# Patient Record
Sex: MALE | Race: WHITE | NOT HISPANIC OR LATINO | Employment: FULL TIME | URBAN - METROPOLITAN AREA
[De-identification: names, ages, dates, MRNs, and addresses within clinical notes are randomized per-mention and may not be internally consistent; named-entity substitution may affect disease eponyms.]

---

## 2022-05-03 ENCOUNTER — PRE-ADMISSION TESTING (OUTPATIENT)
Dept: PREADMISSION TESTING | Facility: HOSPITAL | Age: 57
End: 2022-05-03

## 2022-05-03 ENCOUNTER — HOSPITAL ENCOUNTER (OUTPATIENT)
Dept: GENERAL RADIOLOGY | Facility: HOSPITAL | Age: 57
Discharge: HOME | End: 2022-05-03

## 2022-05-03 VITALS — BODY MASS INDEX: 41.69 KG/M2 | HEIGHT: 70 IN | WEIGHT: 291.23 LBS

## 2022-05-03 LAB
ABO GROUP BLD: NORMAL
ALBUMIN SERPL-MCNC: 4.6 G/DL (ref 3.5–5.2)
ALBUMIN/GLOB SERPL: 1.6 G/DL
ALP SERPL-CCNC: 60 U/L (ref 39–117)
ALT SERPL W P-5'-P-CCNC: 30 U/L (ref 1–41)
ANION GAP SERPL CALCULATED.3IONS-SCNC: 8 MMOL/L (ref 5–15)
AST SERPL-CCNC: 25 U/L (ref 1–40)
BASOPHILS # BLD AUTO: 0.04 10*3/MM3 (ref 0–0.2)
BASOPHILS NFR BLD AUTO: 0.6 % (ref 0–1.5)
BILIRUB SERPL-MCNC: 0.6 MG/DL (ref 0–1.2)
BLD GP AB SCN SERPL QL: NEGATIVE
BUN SERPL-MCNC: 13 MG/DL (ref 6–20)
BUN/CREAT SERPL: 16.5 (ref 7–25)
CALCIUM SPEC-SCNC: 9.4 MG/DL (ref 8.6–10.5)
CHLORIDE SERPL-SCNC: 106 MMOL/L (ref 98–107)
CO2 SERPL-SCNC: 28 MMOL/L (ref 22–29)
CREAT SERPL-MCNC: 0.79 MG/DL (ref 0.76–1.27)
CRP SERPL-MCNC: <0.3 MG/DL (ref 0–0.5)
DEPRECATED RDW RBC AUTO: 41.6 FL (ref 37–54)
EGFRCR SERPLBLD CKD-EPI 2021: 103.6 ML/MIN/1.73
EOSINOPHIL # BLD AUTO: 0.17 10*3/MM3 (ref 0–0.4)
EOSINOPHIL NFR BLD AUTO: 2.5 % (ref 0.3–6.2)
ERYTHROCYTE [DISTWIDTH] IN BLOOD BY AUTOMATED COUNT: 12.8 % (ref 12.3–15.4)
ERYTHROCYTE [SEDIMENTATION RATE] IN BLOOD: 9 MM/HR (ref 0–20)
GLOBULIN UR ELPH-MCNC: 2.8 GM/DL
GLUCOSE SERPL-MCNC: 124 MG/DL (ref 65–99)
HBA1C MFR BLD: 5.8 % (ref 4.8–5.6)
HCT VFR BLD AUTO: 42.2 % (ref 37.5–51)
HGB BLD-MCNC: 14.2 G/DL (ref 13–17.7)
IMM GRANULOCYTES # BLD AUTO: 0.01 10*3/MM3 (ref 0–0.05)
IMM GRANULOCYTES NFR BLD AUTO: 0.1 % (ref 0–0.5)
LYMPHOCYTES # BLD AUTO: 2.54 10*3/MM3 (ref 0.7–3.1)
LYMPHOCYTES NFR BLD AUTO: 37.3 % (ref 19.6–45.3)
MCH RBC QN AUTO: 29.7 PG (ref 26.6–33)
MCHC RBC AUTO-ENTMCNC: 33.6 G/DL (ref 31.5–35.7)
MCV RBC AUTO: 88.3 FL (ref 79–97)
MONOCYTES # BLD AUTO: 0.47 10*3/MM3 (ref 0.1–0.9)
MONOCYTES NFR BLD AUTO: 6.9 % (ref 5–12)
NEUTROPHILS NFR BLD AUTO: 3.58 10*3/MM3 (ref 1.7–7)
NEUTROPHILS NFR BLD AUTO: 52.6 % (ref 42.7–76)
NRBC BLD AUTO-RTO: 0 /100 WBC (ref 0–0.2)
PLATELET # BLD AUTO: 262 10*3/MM3 (ref 140–450)
PMV BLD AUTO: 11.1 FL (ref 6–12)
POTASSIUM SERPL-SCNC: 4.4 MMOL/L (ref 3.5–5.2)
PROT SERPL-MCNC: 7.4 G/DL (ref 6–8.5)
QT INTERVAL: 388 MS
QTC INTERVAL: 419 MS
RBC # BLD AUTO: 4.78 10*6/MM3 (ref 4.14–5.8)
RH BLD: POSITIVE
SODIUM SERPL-SCNC: 142 MMOL/L (ref 136–145)
WBC NRBC COR # BLD: 6.81 10*3/MM3 (ref 3.4–10.8)

## 2022-05-03 PROCEDURE — 86850 RBC ANTIBODY SCREEN: CPT

## 2022-05-03 PROCEDURE — 93010 ELECTROCARDIOGRAM REPORT: CPT | Performed by: INTERNAL MEDICINE

## 2022-05-03 PROCEDURE — 80053 COMPREHEN METABOLIC PANEL: CPT

## 2022-05-03 PROCEDURE — 83036 HEMOGLOBIN GLYCOSYLATED A1C: CPT

## 2022-05-03 PROCEDURE — 86140 C-REACTIVE PROTEIN: CPT

## 2022-05-03 PROCEDURE — 71046 X-RAY EXAM CHEST 2 VIEWS: CPT

## 2022-05-03 PROCEDURE — 36415 COLL VENOUS BLD VENIPUNCTURE: CPT

## 2022-05-03 PROCEDURE — 86900 BLOOD TYPING SEROLOGIC ABO: CPT

## 2022-05-03 PROCEDURE — 85652 RBC SED RATE AUTOMATED: CPT

## 2022-05-03 PROCEDURE — 86901 BLOOD TYPING SEROLOGIC RH(D): CPT

## 2022-05-03 PROCEDURE — 85025 COMPLETE CBC W/AUTO DIFF WBC: CPT

## 2022-05-03 PROCEDURE — 93005 ELECTROCARDIOGRAM TRACING: CPT

## 2022-05-03 ASSESSMENT — KOOS JR
KOOS JR SCORE: 24
KOOS JR SCORE: 24.875

## 2022-05-03 NOTE — DISCHARGE INSTRUCTIONS
Patient instructed to drink 20 ounces (or until full) of Gatorade and it needs to be completed 1 hour (for Main OR patients) or 2 hours (scheduled  section patients) before given arrival time for procedure (NO RED Gatorade)    Patient verbalized understanding.     Patient to apply Chlorhexadine wipes  to surgical area (as instructed) the night before procedure and the AM of procedure. Wipes provided.     Discussed with patient options for receiving total joint replacement education and assessed patient's ability and preference. Joint Replacement Guide given to patient during PAT visit since not received a copy within the last year. Encouraged patient/family to read guide thoroughly and notify PAT staff with any questions or concerns. Handout provided directing patient to links to watch online videos related to joint replacement surgery on the [facility] website. The handout gives detailed instructions for joining an online joint replacement class through Zoom or phone conference offered on . Patient agreed to participate by joining online class through Zoom. Patient verbalized understanding of instructions and to complete the online learning tool survey. Encouraged to share information with family and/or . An overview of the joint replacement education was provided during the visit including general perioperative instructions that are routine for all surgical patients (PAT PASS, wipes, directions to pre-op, etc.).

## 2022-05-03 NOTE — PAT
Patient directed to Radiology Department for CXR after Pre Admission Testing Appointment.     Patient denies any current skin issues.     Patient to apply Chlorhexadine wipes  to surgical area (as instructed) the night before procedure and the AM of procedure. Wipes provided.    Patient instructed to drink 20 ounces (or until full) of Gatorade and it needs to be completed 1 hour (for Main OR patients) or 2 hours (scheduled  section patients) before given arrival time for procedure (NO RED Gatorade)    Patient verbalized understanding.    Discussed with patient options for receiving total joint replacement education and assessed patient's ability and preference. Joint Replacement Guide given to patient during PAT visit since not received a copy within the last year. Encouraged patient/family to read guide thoroughly and notify PAT staff with any questions or concerns. Handout provided directing patient to links to watch online videos related to joint replacement surgery on the [facility] website. The handout gives detailed instructions for joining an online joint replacement class through Zoom or phone conference offered on . Patient agreed to participate by watching videos online. Patient verbalized understanding of instructions and to complete the online learning tool survey. Encouraged to share information with family and/or . An overview of the joint replacement education was provided during the visit including general perioperative instructions that are routine for all surgical patients (PAT PASS, wipes, directions to pre-op, etc.).

## 2022-05-06 ENCOUNTER — CLINICAL SUPPORT NO REQUIREMENTS (OUTPATIENT)
Dept: PREADMISSION TESTING | Facility: HOSPITAL | Age: 57
End: 2022-05-06

## 2022-05-06 LAB — SARS-COV-2 RNA PNL SPEC NAA+PROBE: NOT DETECTED

## 2022-05-06 PROCEDURE — C9803 HOPD COVID-19 SPEC COLLECT: HCPCS

## 2022-05-06 PROCEDURE — U0004 COV-19 TEST NON-CDC HGH THRU: HCPCS

## 2022-05-08 ENCOUNTER — ANESTHESIA EVENT (OUTPATIENT)
Dept: PERIOP | Facility: HOSPITAL | Age: 57
End: 2022-05-08

## 2022-05-08 RX ORDER — SODIUM CHLORIDE 0.9 % (FLUSH) 0.9 %
10 SYRINGE (ML) INJECTION EVERY 12 HOURS SCHEDULED
Status: CANCELLED | OUTPATIENT
Start: 2022-05-08

## 2022-05-08 RX ORDER — FAMOTIDINE 10 MG/ML
20 INJECTION, SOLUTION INTRAVENOUS ONCE
Status: CANCELLED | OUTPATIENT
Start: 2022-05-08 | End: 2022-05-08

## 2022-05-08 RX ORDER — SODIUM CHLORIDE 0.9 % (FLUSH) 0.9 %
10 SYRINGE (ML) INJECTION AS NEEDED
Status: CANCELLED | OUTPATIENT
Start: 2022-05-08

## 2022-05-09 ENCOUNTER — ANESTHESIA EVENT CONVERTED (OUTPATIENT)
Dept: ANESTHESIOLOGY | Facility: HOSPITAL | Age: 57
End: 2022-05-09

## 2022-05-09 ENCOUNTER — HOSPITAL ENCOUNTER (OUTPATIENT)
Facility: HOSPITAL | Age: 57
Discharge: HOME | End: 2022-05-09
Attending: ORTHOPAEDIC SURGERY | Admitting: ORTHOPAEDIC SURGERY

## 2022-05-09 ENCOUNTER — ANESTHESIA (OUTPATIENT)
Dept: PERIOP | Facility: HOSPITAL | Age: 57
End: 2022-05-09

## 2022-05-09 ENCOUNTER — APPOINTMENT (OUTPATIENT)
Dept: GENERAL RADIOLOGY | Facility: HOSPITAL | Age: 57
End: 2022-05-09

## 2022-05-09 VITALS
BODY MASS INDEX: 41.66 KG/M2 | HEIGHT: 70 IN | HEART RATE: 83 BPM | WEIGHT: 291 LBS | RESPIRATION RATE: 18 BRPM | DIASTOLIC BLOOD PRESSURE: 85 MMHG | OXYGEN SATURATION: 94 % | TEMPERATURE: 97.5 F | SYSTOLIC BLOOD PRESSURE: 140 MMHG

## 2022-05-09 DIAGNOSIS — Z96.652 S/P TOTAL KNEE ARTHROPLASTY, LEFT: Primary | ICD-10-CM

## 2022-05-09 PROBLEM — E66.9 OBESITY: Status: ACTIVE | Noted: 2022-05-09

## 2022-05-09 PROBLEM — M17.12 ARTHRITIS OF LEFT KNEE: Status: ACTIVE | Noted: 2022-05-09

## 2022-05-09 LAB — POTASSIUM SERPL-SCNC: 4.4 MMOL/L (ref 3.5–5.2)

## 2022-05-09 PROCEDURE — C1713 ANCHOR/SCREW BN/BN,TIS/BN: HCPCS | Performed by: ORTHOPAEDIC SURGERY

## 2022-05-09 PROCEDURE — 25010000002 PROPOFOL 10 MG/ML EMULSION: Performed by: NURSE ANESTHETIST, CERTIFIED REGISTERED

## 2022-05-09 PROCEDURE — 84132 ASSAY OF SERUM POTASSIUM: CPT | Performed by: ANESTHESIOLOGY

## 2022-05-09 PROCEDURE — 25010000002 VANCOMYCIN 10 G RECONSTITUTED SOLUTION: Performed by: ORTHOPAEDIC SURGERY

## 2022-05-09 PROCEDURE — 97161 PT EVAL LOW COMPLEX 20 MIN: CPT

## 2022-05-09 PROCEDURE — 97116 GAIT TRAINING THERAPY: CPT

## 2022-05-09 PROCEDURE — 27447 TOTAL KNEE ARTHROPLASTY: CPT | Performed by: PHYSICIAN ASSISTANT

## 2022-05-09 PROCEDURE — 25010000002 CEFAZOLIN IN DEXTROSE 2-4 GM/100ML-% SOLUTION: Performed by: NURSE ANESTHETIST, CERTIFIED REGISTERED

## 2022-05-09 PROCEDURE — 25010000002 ROPIVACAINE PER 1 MG: Performed by: NURSE ANESTHETIST, CERTIFIED REGISTERED

## 2022-05-09 PROCEDURE — C1776 JOINT DEVICE (IMPLANTABLE): HCPCS | Performed by: ORTHOPAEDIC SURGERY

## 2022-05-09 PROCEDURE — 25010000002 ONDANSETRON PER 1 MG: Performed by: NURSE ANESTHETIST, CERTIFIED REGISTERED

## 2022-05-09 PROCEDURE — C1755 CATHETER, INTRASPINAL: HCPCS | Performed by: ORTHOPAEDIC SURGERY

## 2022-05-09 PROCEDURE — 25010000002 DEXAMETHASONE PER 1 MG: Performed by: NURSE ANESTHETIST, CERTIFIED REGISTERED

## 2022-05-09 PROCEDURE — 73560 X-RAY EXAM OF KNEE 1 OR 2: CPT

## 2022-05-09 PROCEDURE — 25010000002 CEFAZOLIN IN DEXTROSE 2-4 GM/100ML-% SOLUTION: Performed by: ORTHOPAEDIC SURGERY

## 2022-05-09 DEVICE — GENESIS II RESURFACING PATELLAR 35MM
Type: IMPLANTABLE DEVICE | Site: KNEE | Status: FUNCTIONAL
Brand: GENESIS II

## 2022-05-09 DEVICE — GENESIS II NON-POROUS TIBIAL                                    BASEPLATE SIZE 6 LT
Type: IMPLANTABLE DEVICE | Site: KNEE | Status: FUNCTIONAL
Brand: GENESIS II

## 2022-05-09 DEVICE — DEV CONTRL TISS STRATAFIX SYMM PDS PLUS VIL CT-1 45CM: Type: IMPLANTABLE DEVICE | Site: KNEE | Status: FUNCTIONAL

## 2022-05-09 DEVICE — LEGION HIGHLY CROSS LINKED                                    POLYETHYLENE DISHED INSERT SIZE 5-6 13MM
Type: IMPLANTABLE DEVICE | Site: KNEE | Status: FUNCTIONAL
Brand: LEGION

## 2022-05-09 DEVICE — IMPLANTABLE DEVICE: Type: IMPLANTABLE DEVICE | Site: KNEE | Status: FUNCTIONAL

## 2022-05-09 DEVICE — CMT BONE PALACOS R HI/VISC 1X40: Type: IMPLANTABLE DEVICE | Site: KNEE | Status: FUNCTIONAL

## 2022-05-09 DEVICE — LEGION CRUCIATE RETAINING OXINIUM                                    FEMORAL SIZE 7 LEFT
Type: IMPLANTABLE DEVICE | Site: KNEE | Status: FUNCTIONAL
Brand: LEGION

## 2022-05-09 RX ORDER — OXYCODONE HYDROCHLORIDE 5 MG/1
5 TABLET ORAL EVERY 4 HOURS PRN
Status: DISCONTINUED | OUTPATIENT
Start: 2022-05-09 | End: 2022-05-09 | Stop reason: HOSPADM

## 2022-05-09 RX ORDER — MAGNESIUM HYDROXIDE 1200 MG/15ML
LIQUID ORAL AS NEEDED
Status: DISCONTINUED | OUTPATIENT
Start: 2022-05-09 | End: 2022-05-09 | Stop reason: HOSPADM

## 2022-05-09 RX ORDER — ONDANSETRON 2 MG/ML
4 INJECTION INTRAMUSCULAR; INTRAVENOUS EVERY 6 HOURS PRN
Status: DISCONTINUED | OUTPATIENT
Start: 2022-05-09 | End: 2022-05-09 | Stop reason: HOSPADM

## 2022-05-09 RX ORDER — LABETALOL HYDROCHLORIDE 5 MG/ML
10 INJECTION, SOLUTION INTRAVENOUS EVERY 4 HOURS PRN
Status: DISCONTINUED | OUTPATIENT
Start: 2022-05-09 | End: 2022-05-09 | Stop reason: HOSPADM

## 2022-05-09 RX ORDER — ONDANSETRON 4 MG/1
4 TABLET, FILM COATED ORAL EVERY 6 HOURS PRN
Status: DISCONTINUED | OUTPATIENT
Start: 2022-05-09 | End: 2022-05-09 | Stop reason: HOSPADM

## 2022-05-09 RX ORDER — SODIUM CHLORIDE 0.9 % (FLUSH) 0.9 %
3 SYRINGE (ML) INJECTION EVERY 12 HOURS SCHEDULED
Status: DISCONTINUED | OUTPATIENT
Start: 2022-05-09 | End: 2022-05-09 | Stop reason: HOSPADM

## 2022-05-09 RX ORDER — TRANEXAMIC ACID 10 MG/ML
1000 INJECTION, SOLUTION INTRAVENOUS ONCE
Status: DISCONTINUED | OUTPATIENT
Start: 2022-05-09 | End: 2022-05-09 | Stop reason: HOSPADM

## 2022-05-09 RX ORDER — MELOXICAM 15 MG/1
15 TABLET ORAL DAILY
Qty: 15 TABLET | Refills: 0 | Status: SHIPPED | OUTPATIENT
Start: 2022-05-09 | End: 2022-05-24

## 2022-05-09 RX ORDER — ASPIRIN 325 MG
325 TABLET, DELAYED RELEASE (ENTERIC COATED) ORAL DAILY
Qty: 30 TABLET | Refills: 0 | Status: SHIPPED | OUTPATIENT
Start: 2022-05-10 | End: 2022-06-09

## 2022-05-09 RX ORDER — MELOXICAM 7.5 MG/1
15 TABLET ORAL DAILY
Status: DISCONTINUED | OUTPATIENT
Start: 2022-05-09 | End: 2022-05-09 | Stop reason: HOSPADM

## 2022-05-09 RX ORDER — LIDOCAINE HYDROCHLORIDE 10 MG/ML
0.5 INJECTION, SOLUTION EPIDURAL; INFILTRATION; INTRACAUDAL; PERINEURAL ONCE AS NEEDED
Status: COMPLETED | OUTPATIENT
Start: 2022-05-09 | End: 2022-05-09

## 2022-05-09 RX ORDER — DEXAMETHASONE SODIUM PHOSPHATE 4 MG/ML
INJECTION, SOLUTION INTRA-ARTICULAR; INTRALESIONAL; INTRAMUSCULAR; INTRAVENOUS; SOFT TISSUE AS NEEDED
Status: DISCONTINUED | OUTPATIENT
Start: 2022-05-09 | End: 2022-05-09 | Stop reason: SURG

## 2022-05-09 RX ORDER — NALOXONE HCL 0.4 MG/ML
0.1 VIAL (ML) INJECTION
Status: DISCONTINUED | OUTPATIENT
Start: 2022-05-09 | End: 2022-05-09 | Stop reason: HOSPADM

## 2022-05-09 RX ORDER — OXYCODONE HYDROCHLORIDE 5 MG/1
5 TABLET ORAL EVERY 4 HOURS PRN
Qty: 40 TABLET | Refills: 0 | Status: SHIPPED | OUTPATIENT
Start: 2022-05-09

## 2022-05-09 RX ORDER — LIDOCAINE HYDROCHLORIDE 10 MG/ML
INJECTION, SOLUTION EPIDURAL; INFILTRATION; INTRACAUDAL; PERINEURAL AS NEEDED
Status: DISCONTINUED | OUTPATIENT
Start: 2022-05-09 | End: 2022-05-09 | Stop reason: SURG

## 2022-05-09 RX ORDER — ASPIRIN 325 MG
325 TABLET ORAL DAILY
Status: DISCONTINUED | OUTPATIENT
Start: 2022-05-10 | End: 2022-05-09 | Stop reason: HOSPADM

## 2022-05-09 RX ORDER — CEFAZOLIN SODIUM 2 G/100ML
INJECTION, SOLUTION INTRAVENOUS AS NEEDED
Status: DISCONTINUED | OUTPATIENT
Start: 2022-05-09 | End: 2022-05-09 | Stop reason: SURG

## 2022-05-09 RX ORDER — FENTANYL CITRATE 50 UG/ML
50 INJECTION, SOLUTION INTRAMUSCULAR; INTRAVENOUS
Status: DISCONTINUED | OUTPATIENT
Start: 2022-05-09 | End: 2022-05-09 | Stop reason: HOSPADM

## 2022-05-09 RX ORDER — PROMETHAZINE HYDROCHLORIDE 12.5 MG/1
12.5 TABLET ORAL EVERY 6 HOURS PRN
Status: DISCONTINUED | OUTPATIENT
Start: 2022-05-09 | End: 2022-05-09 | Stop reason: HOSPADM

## 2022-05-09 RX ORDER — ONDANSETRON 2 MG/ML
INJECTION INTRAMUSCULAR; INTRAVENOUS AS NEEDED
Status: DISCONTINUED | OUTPATIENT
Start: 2022-05-09 | End: 2022-05-09 | Stop reason: SURG

## 2022-05-09 RX ORDER — DOCUSATE SODIUM 100 MG/1
100 CAPSULE, LIQUID FILLED ORAL 2 TIMES DAILY
Qty: 30 CAPSULE | Refills: 0 | Status: SHIPPED | OUTPATIENT
Start: 2022-05-09 | End: 2022-05-24

## 2022-05-09 RX ORDER — BUPIVACAINE HYDROCHLORIDE 2.5 MG/ML
INJECTION, SOLUTION EPIDURAL; INFILTRATION; INTRACAUDAL
Status: COMPLETED | OUTPATIENT
Start: 2022-05-09 | End: 2022-05-09

## 2022-05-09 RX ORDER — SODIUM CHLORIDE 0.9 % (FLUSH) 0.9 %
3-10 SYRINGE (ML) INJECTION AS NEEDED
Status: DISCONTINUED | OUTPATIENT
Start: 2022-05-09 | End: 2022-05-09 | Stop reason: HOSPADM

## 2022-05-09 RX ORDER — POLYETHYLENE GLYCOL 3350 17 G/17G
17 POWDER, FOR SOLUTION ORAL DAILY
Status: DISCONTINUED | OUTPATIENT
Start: 2022-05-09 | End: 2022-05-09 | Stop reason: HOSPADM

## 2022-05-09 RX ORDER — CEFAZOLIN SODIUM 2 G/100ML
2 INJECTION, SOLUTION INTRAVENOUS EVERY 8 HOURS
Status: DISCONTINUED | OUTPATIENT
Start: 2022-05-09 | End: 2022-05-09 | Stop reason: HOSPADM

## 2022-05-09 RX ORDER — ACETAMINOPHEN 500 MG
1000 TABLET ORAL EVERY 8 HOURS
Qty: 42 TABLET | Refills: 0
Start: 2022-05-09 | End: 2022-05-16

## 2022-05-09 RX ORDER — MIDAZOLAM HYDROCHLORIDE 1 MG/ML
1 INJECTION INTRAMUSCULAR; INTRAVENOUS
Status: DISCONTINUED | OUTPATIENT
Start: 2022-05-09 | End: 2022-05-09 | Stop reason: HOSPADM

## 2022-05-09 RX ORDER — BUPIVACAINE HYDROCHLORIDE 5 MG/ML
INJECTION, SOLUTION PERINEURAL
Status: COMPLETED | OUTPATIENT
Start: 2022-05-09 | End: 2022-05-09

## 2022-05-09 RX ORDER — PROPOFOL 10 MG/ML
VIAL (ML) INTRAVENOUS AS NEEDED
Status: DISCONTINUED | OUTPATIENT
Start: 2022-05-09 | End: 2022-05-09 | Stop reason: SURG

## 2022-05-09 RX ORDER — PREGABALIN 75 MG/1
75 CAPSULE ORAL ONCE
Status: COMPLETED | OUTPATIENT
Start: 2022-05-09 | End: 2022-05-09

## 2022-05-09 RX ORDER — TRANEXAMIC ACID 10 MG/ML
1000 INJECTION, SOLUTION INTRAVENOUS ONCE
Status: COMPLETED | OUTPATIENT
Start: 2022-05-09 | End: 2022-05-09

## 2022-05-09 RX ORDER — FAMOTIDINE 20 MG/1
20 TABLET, FILM COATED ORAL ONCE
Status: COMPLETED | OUTPATIENT
Start: 2022-05-09 | End: 2022-05-09

## 2022-05-09 RX ORDER — SODIUM CHLORIDE 9 MG/ML
150 INJECTION, SOLUTION INTRAVENOUS CONTINUOUS
Status: DISCONTINUED | OUTPATIENT
Start: 2022-05-09 | End: 2022-05-09 | Stop reason: HOSPADM

## 2022-05-09 RX ORDER — SODIUM CHLORIDE, SODIUM LACTATE, POTASSIUM CHLORIDE, CALCIUM CHLORIDE 600; 310; 30; 20 MG/100ML; MG/100ML; MG/100ML; MG/100ML
9 INJECTION, SOLUTION INTRAVENOUS CONTINUOUS
Status: DISCONTINUED | OUTPATIENT
Start: 2022-05-09 | End: 2022-05-09 | Stop reason: HOSPADM

## 2022-05-09 RX ORDER — ACETAMINOPHEN 500 MG
1000 TABLET ORAL EVERY 8 HOURS
Status: DISCONTINUED | OUTPATIENT
Start: 2022-05-09 | End: 2022-05-09 | Stop reason: HOSPADM

## 2022-05-09 RX ORDER — HYDROMORPHONE HYDROCHLORIDE 1 MG/ML
0.5 INJECTION, SOLUTION INTRAMUSCULAR; INTRAVENOUS; SUBCUTANEOUS
Status: DISCONTINUED | OUTPATIENT
Start: 2022-05-09 | End: 2022-05-09 | Stop reason: HOSPADM

## 2022-05-09 RX ADMIN — BUPIVACAINE HYDROCHLORIDE 2.2 ML: 5 INJECTION, SOLUTION PERINEURAL at 07:50

## 2022-05-09 RX ADMIN — PROPOFOL 30 MG: 10 INJECTION, EMULSION INTRAVENOUS at 07:56

## 2022-05-09 RX ADMIN — MUPIROCIN 1 APPLICATION: 20 OINTMENT TOPICAL at 07:13

## 2022-05-09 RX ADMIN — BUPIVACAINE HYDROCHLORIDE 30 ML: 2.5 INJECTION, SOLUTION EPIDURAL; INFILTRATION; INTRACAUDAL; PERINEURAL at 09:54

## 2022-05-09 RX ADMIN — LIDOCAINE HYDROCHLORIDE 25 MG: 10 INJECTION, SOLUTION EPIDURAL; INFILTRATION; INTRACAUDAL; PERINEURAL at 07:47

## 2022-05-09 RX ADMIN — Medication 3 ML: at 13:05

## 2022-05-09 RX ADMIN — ONDANSETRON 4 MG: 2 INJECTION INTRAMUSCULAR; INTRAVENOUS at 09:37

## 2022-05-09 RX ADMIN — ACETAMINOPHEN 1000 MG: 500 TABLET ORAL at 13:04

## 2022-05-09 RX ADMIN — OXYCODONE 5 MG: 5 TABLET ORAL at 14:48

## 2022-05-09 RX ADMIN — ROPIVACAINE HYDROCHLORIDE 10 ML/HR: 5 INJECTION, SOLUTION EPIDURAL; INFILTRATION; PERINEURAL at 09:31

## 2022-05-09 RX ADMIN — PROPOFOL 125 MCG/KG/MIN: 10 INJECTION, EMULSION INTRAVENOUS at 07:52

## 2022-05-09 RX ADMIN — TRANEXAMIC ACID 1000 MG: 10 INJECTION, SOLUTION INTRAVENOUS at 07:55

## 2022-05-09 RX ADMIN — LIDOCAINE HYDROCHLORIDE 0.5 ML: 10 INJECTION, SOLUTION EPIDURAL; INFILTRATION; INTRACAUDAL; PERINEURAL at 07:13

## 2022-05-09 RX ADMIN — SODIUM CHLORIDE, POTASSIUM CHLORIDE, SODIUM LACTATE AND CALCIUM CHLORIDE 9 ML/HR: 600; 310; 30; 20 INJECTION, SOLUTION INTRAVENOUS at 07:14

## 2022-05-09 RX ADMIN — PROPOFOL 50 MG: 10 INJECTION, EMULSION INTRAVENOUS at 07:52

## 2022-05-09 RX ADMIN — MELOXICAM 15 MG: 7.5 TABLET ORAL at 13:04

## 2022-05-09 RX ADMIN — FAMOTIDINE 20 MG: 20 TABLET ORAL at 07:13

## 2022-05-09 RX ADMIN — VANCOMYCIN HYDROCHLORIDE 2000 MG: 10 INJECTION, POWDER, LYOPHILIZED, FOR SOLUTION INTRAVENOUS at 07:10

## 2022-05-09 RX ADMIN — CEFAZOLIN SODIUM 2 G: 2 INJECTION, SOLUTION INTRAVENOUS at 08:19

## 2022-05-09 RX ADMIN — PROPOFOL 50 MG: 10 INJECTION, EMULSION INTRAVENOUS at 07:48

## 2022-05-09 RX ADMIN — PREGABALIN 75 MG: 75 CAPSULE ORAL at 07:13

## 2022-05-09 RX ADMIN — VANCOMYCIN HYDROCHLORIDE 2000 MG: 10 INJECTION, POWDER, LYOPHILIZED, FOR SOLUTION INTRAVENOUS at 07:45

## 2022-05-09 RX ADMIN — LIDOCAINE HYDROCHLORIDE 25 MG: 10 INJECTION, SOLUTION EPIDURAL; INFILTRATION; INTRACAUDAL; PERINEURAL at 07:52

## 2022-05-09 RX ADMIN — CEFAZOLIN SODIUM 2 G: 2 INJECTION, SOLUTION INTRAVENOUS at 17:06

## 2022-05-09 RX ADMIN — DEXAMETHASONE SODIUM PHOSPHATE 8 MG: 4 INJECTION, SOLUTION INTRA-ARTICULAR; INTRALESIONAL; INTRAMUSCULAR; INTRAVENOUS; SOFT TISSUE at 08:00

## 2022-05-09 NOTE — THERAPY EVALUATION
Patient Name: Ja Barros  : 1965    MRN: [id number]                              Today's Date: 2022       Admit Date: 2022    Visit Dx:     ICD-10-CM ICD-9-CM   1. S/P total knee arthroplasty, left  Z96.652 V43.65     Patient Active Problem List   Diagnosis   • Arthritis of left knee   • S/P total knee arthroplasty, left   • Obesity     Past Medical History:   Diagnosis Date   • Arthritis    • History of MRSA infection     [facility]   • Wears eyeglasses      Past Surgical History:   Procedure Laterality Date   • COLONOSCOPY     • EYE SURGERY Bilateral     RK    • HAND SURGERY Right     thumb reattachment age 10   • KNEE ARTHROSCOPY Left    • SKIN BIOPSY        General Information     Row Name 22 1305          Physical Therapy Time and Intention    Document Type evaluation  -RADHA     Mode of Treatment individual therapy;physical therapy  -RADHA     Row Name 22 1305          General Information    Patient Profile Reviewed yes  -RADHA     Prior Level of Function min assist:;all household mobility;transfer;bed mobility;ADL's  -RADHA     Existing Precautions/Restrictions fall;other (see comments)  L adductor nerve cath; URBAN  -RADHA     Barriers to Rehab none identified  -RADHA     Row Name 22 1305          Living Environment    People in Home alone  -RADHA     Row Name 22 1305          Home Main Entrance    Number of Stairs, Main Entrance other (see comments)  30  -RADHA     Stair Railings, Main Entrance railings on both sides of stairs  -RADHA     Row Name 22 1305          Stairs Within Home, Primary    Stairs, Within Home, Primary 0  -RADHA     Number of Stairs, Within Home, Primary none  -RADAH     Row Name 22 1305          Cognition    Orientation Status (Cognition) oriented x 4  -RADHA     Row Name 22 1305          Safety Issues, Functional Mobility    Safety Issues Affecting Function (Mobility) safety precaution awareness;safety precautions  follow-through/compliance  -RADHA     Impairments Affecting Function (Mobility) endurance/activity tolerance;strength;range of motion (ROM);pain  -RADHA           User Key  (r) = Recorded By, (t) = Taken By, (c) = Cosigned By    Initials Name Provider Type    RADHA Kobe No, PT Physical Therapist               Mobility     Row Name 05/09/22 1305          Bed Mobility    Bed Mobility scooting/bridging;supine-sit  -RADHA     Scooting/Bridging [place] (Bed Mobility) supervision;verbal cues  -RADHA     Supine-Sit [place] (Bed Mobility) supervision;verbal cues  -RADHA     Assistive Device (Bed Mobility) bed rails;head of bed elevated  -RADHA     Comment, (Bed Mobility) Verbal cues for LE sequencing off of EOB and trunk control into sitting  -RADHA     Row Name 05/09/22 1305          Sit-Stand Transfer    Sit-Stand [place] (Transfers) verbal cues;contact guard  -RADHA     Assistive Device (Sit-Stand Transfers) walker, front-wheeled  -RADHA     Comment, (Sit-Stand Transfer) Verbal cues for safe hand placement during standing/sitting and moving L LE out for comfort prior to sitting  -RADHA     Row Name 05/09/22 1305          Gait/Stairs (Locomotion)    [place] Level (Gait) verbal cues;contact guard;1 person to manage equipment  -RADHA     Assistive Device (Gait) walker, front-wheeled  -RADHA     Distance in Feet (Gait) 310  -RADHA     Deviations/Abnormal Patterns (Gait) bilateral deviations;juan r decreased;gait speed decreased;weight shifting decreased  -RADHA     Bilateral Gait Deviations forward flexed posture  -RADHA     Left Sided Gait Deviations heel strike decreased;weight shift ability decreased  -RADHA     [place] Level (Stairs) verbal cues;contact guard  -RADHA     Handrail Location (Stairs) both sides  -RADHA     Number of Steps (Stairs) 20  -RADHA     Ascending Technique (Stairs) step-to-step  -RADHA     Descending Technique (Stairs) step-to-step  -RADHA     Comment, (Gait/Stairs) Pt ambulated with step through pattern and decreased speed. Verbal  cues for maintaining upright posture, body within walker, and increase step length. Pt ascended/descended 20 steps using bilateral HRs and CGA. Gait/stair training limited by fatigue. No knee buckling noted.  -[facility] Name 05/09/22 [facility]5          Mobility    Extremity Weight-bearing Status left lower extremity  -     Left Lower Extremity (Weight-bearing Status) weight-bearing as tolerated (WBAT)  -           User Key  (r) = Recorded By, (t) = Taken By, (c) = Cosigned By    Initials Name Provider Type    RADHA Kobe No, PT Physical Therapist               Obj/Interventions     [facility] Name 05/09/22 1305          Range of Motion Comprehensive    General Range of Motion lower extremity range of motion deficits identified  -     Comment, General Range of Motion R LE AROM WFL; L knee AROM 5-75 degrees; able to actively DF/PF  -RADHA     Row Name 05/09/22 1305          Strength Comprehensive (MMT)    General Manual Muscle Testing (MMT) Assessment lower extremity strength deficits identified  -     Comment, General Manual Muscle Testing (MMT) Assessment R LE functionally 4+/5; L LE functionally 4-/5; IND with SLR  -[facility] Name 05/09/22 1305          Motor Skills    Therapeutic Exercise hip;knee;ankle;other (see comments)  heel raises 3x  -[facility] Name 05/09/22 1305          Hip (Therapeutic Exercise)    Hip (Therapeutic Exercise) isometric exercises  -     Hip Isometrics (Therapeutic Exercise) gluteal sets;10 repetitions  -RADHA     Row Name 05/09/22 [facility]5          Knee (Therapeutic Exercise)    Knee (Therapeutic Exercise) isometric exercises;strengthening exercise  -     Knee Isometrics (Therapeutic Exercise) quad sets;10 repetitions  -     Knee Strengthening (Therapeutic Exercise) left;heel slides;SLR (straight leg raise);SAQ (short arc quad);LAQ (long arc quad);3 repetitions  -[facility] Name 05/09/22 1305          Ankle (Therapeutic Exercise)    Ankle (Therapeutic Exercise) AROM (active range of motion)  -      Ankle AROM (Therapeutic Exercise) bilateral;dorsiflexion;plantarflexion;10 repetitions  -RADHA     Row Name 05/09/22 1305          Sensory Assessment (Somatosensory)    Sensory Assessment (Somatosensory) LE sensation intact  -RADHA           User Key  (r) = Recorded By, (t) = Taken By, (c) = Cosigned By    Initials Name Provider Type    Kobe Davalos, PT Physical Therapist               Goals/Plan     Row Name 05/09/22 1305          Bed Mobility Goal 1 (PT)    Activity/Assistive Device (Bed Mobility Goal 1, PT) sit to supine/supine to sit  -RADHA     [place] Level/Cues Needed (Bed Mobility Goal 1, PT) modified independence  -RADHA     Time Frame (Bed Mobility Goal 1, PT) long term goal (LTG);3 days  -RADHA     Row Name 05/09/22 1305          Transfer Goal 1 (PT)    Activity/Assistive Device (Transfer Goal 1, PT) sit-to-stand/stand-to-sit;walker, rolling  -RADHA     [place] Level/Cues Needed (Transfer Goal 1, PT) modified independence  -RADHA     Time Frame (Transfer Goal 1, PT) long term goal (LTG);3 days  -RADHA     Row Name 05/09/22 1305          Gait Training Goal 1 (PT)    Activity/Assistive Device (Gait Training Goal 1, PT) gait (walking locomotion);walker, rolling  -RADHA     [place] Level (Gait Training Goal 1, PT) modified independence  -RADHA     Distance (Gait Training Goal 1, PT) 500 feet  -RADHA     Time Frame (Gait Training Goal 1, PT) long term goal (LTG);3 days  -RADHA     Row Name 05/09/22 1305          ROM Goal 1 (PT)    ROM Goal 1 (PT) L knee AROM 0-90 degrees  -RADHA     Time Frame (ROM Goal 1, PT) long-term goal (LTG);3 days  -RADHA     Row Name 05/09/22 1305          Stairs Goal 1 (PT)    Activity/Assistive Device (Stairs Goal 1, PT) stairs, all skills;using handrail, left;using handrail, right  -RADHA     [place] Level/Cues Needed (Stairs Goal 1, PT) modified independence  -RADHA     Number of Stairs (Stairs Goal 1, PT) 30  -RADHA     Time Frame (Stairs Goal 1, PT) long term goal (LTG);3 days  -RADHA     Row Name 05/09/22  1305          Therapy Assessment/Plan (PT)    Planned Therapy Interventions (PT) balance training;bed mobility training;gait training;home exercise program;patient/family education;transfer training;stair training;strengthening;ROM (range of motion)  -RADHA           User Key  (r) = Recorded By, (t) = Taken By, (c) = Cosigned By    Initials Name Provider Type    Kobe Davalos, PT Physical Therapist               Clinical Impression     Row Name 05/09/22 1305          Pain    Pretreatment Pain Rating 2/10  -RADHA     Posttreatment Pain Rating 4/10  -RADHA     Pain Location - Side/Orientation Left  -RADHA     Pain Location anterior  -RADHA     Pain Location - knee  -RADHA     Pain Intervention(s) Ambulation/increased activity;Repositioned;Cold applied  -     Row Name 05/09/22 1305          Therapy Assessment/Plan (PT)    Patient/Family Therapy Goals Statement (PT) To return home  -RADHA     Rehab Potential (PT) good, to achieve stated therapy goals  -RADHA     Criteria for Skilled Interventions Met (PT) yes;meets criteria;skilled treatment is necessary  -RADHA     Therapy Frequency (PT) 2 times/day  -     Row Name 05/09/22 1305          Positioning and Restraints    Pre-Treatment Position in bed  -RADHA     Post Treatment Position chair  -RADHA     In Chair notified nsg;reclined;call light within reach;encouraged to call for assist;exit alarm on;legs elevated  -RADHA           User Key  (r) = Recorded By, (t) = Taken By, (c) = Cosigned By    Initials Name Provider Type    Kobe Davalos, PT Physical Therapist               Outcome Measures     Row Name 05/09/22 1305 05/09/22 1145       How much help from another person do you currently need...    Turning from your back to your side while in flat bed without using bedrails? 4  -RADHA 3  -KL    Moving from lying on back to sitting on the side of a flat bed without bedrails? 4  -RADHA 3  -KL    Moving to and from a bed to a chair (including a wheelchair)? 3  -RADHA 2  -KL    Standing up from a chair using your  arms (e.g., wheelchair, bedside chair)? 3  -RADHA 2  -KL    Climbing 3-5 steps with a railing? 3  -RADHA 2  -KL    To walk in hospital room? 3  -RADHA 2  -KL    AM-PAC 6 Clicks Score (PT) 20  -RADHA 14  -KL    Highest level of mobility 6 --> Walked 10 steps or more  -RADHA 4 --> Transferred to chair/commode  -    Row Name 05/09/22 1305          PADD    Diagnosis 1  -RADHA     Gender 2  -RADHA     Age Group 2  -RADHA     Gait Distance 1  -RADHA     Assist Level 1  -RADHA     Home Support 3  -RADHA     PADD Score 10  -RADHA     Patient Preference home with home health  -     Prediction by PADD Score directly home (with home health or out-patient rehab)  -     Row Name 05/09/22 1305          Functional Assessment    Outcome Measure Options AM-PAC 6 Clicks Basic Mobility (PT);PADD  -RADHA           User Key  (r) = Recorded By, (t) = Taken By, (c) = Cosigned By    Initials Name Provider Type    Keira Lewis, RN Registered Nurse    Kobe Davalos, PT Physical Therapist                             Physical Therapy Education                 Title: PT OT SLP Therapies (Done)     Topic: Physical Therapy (Done)     Point: Mobility training (Done)     Learning Progress Summary           Patient Acceptance, E,D,H, VU by RADHA at 5/9/2022 1305    Comment: Educated on safe sequencing with bed mobility, ambulatory/car transfers, gait, and stair training. Reviewed HEP and knee precautions via handout.                   Point: Home exercise program (Done)     Learning Progress Summary           Patient Acceptance, E,D,H, VU by RADHA at 5/9/2022 1305    Comment: Educated on safe sequencing with bed mobility, ambulatory/car transfers, gait, and stair training. Reviewed HEP and knee precautions via handout.                   Point: Body mechanics (Done)     Learning Progress Summary           Patient Acceptance, E,D,H, VU by RADHA at 5/9/2022 1305    Comment: Educated on safe sequencing with bed mobility, ambulatory/car transfers, gait, and stair training. Reviewed HEP  and knee precautions via handout.                   Point: Precautions (Done)     Learning Progress Summary           Patient Acceptance, E,D,H, VU by  at 5/9/2022 1305    Comment: Educated on safe sequencing with bed mobility, ambulatory/car transfers, gait, and stair training. Reviewed HEP and knee precautions via handout.                               User Key     Initials Effective Dates Name Provider Type Discipline     06/16/21 -  Kobe No, PT Physical Therapist PT              PT Recommendation and Plan  Planned Therapy Interventions (PT): balance training, bed mobility training, gait training, home exercise program, patient/family education, transfer training, stair training, strengthening, ROM (range of motion)  Plan of Care Reviewed With: patient  Progress: improving  Outcome Evaluation: PT eval complete. Pt ambulated 310 feet using RW, CGA, and one person to manage equipment. Pt ascended/descended 20 steps using bilateral HRs and CGA. Gait/stair training limited by fatigue. Bed mobility performed with supervision and STS with CGA. No knee buckling noted. Pt IND with SLR. L Knee AROM measured at 5-75 degrees. Reviewed HEP and knee precautions via handout. Educated on safe car transfers. PADD score = 10. ADLs assessed, pt does not require OT eval tonight. Functionally, pt safe to d/c home with assist today from a PT perspective. Recommend HHPT.     Time Calculation:    PT Charges     Row Name 05/09/22 1305             Time Calculation    Start Time 1305  -RADHA      PT Received On 05/09/22  -      PT Goal Re-Cert Due Date 05/19/22  -              Time Calculation- PT    Total Timed Code Minutes- PT 16 minute(s)  -              Timed Charges    44194 - PT Therapeutic Exercise Minutes 6  -RADHA      91697 - Gait Training Minutes  10  -RADHA              Untimed Charges    PT Eval/Re-eval Minutes 46  -RADHA              Total Minutes    Timed Charges Total Minutes 16  -RADHA      Untimed Charges Total Minutes 46   -RADHA       Total Minutes 62  -RADHA            User Key  (r) = Recorded By, (t) = Taken By, (c) = Cosigned By    Initials Name Provider Type    Kobe Davalos, PT Physical Therapist              Therapy Charges for Today     Code Description Service Date Service Provider Modifiers Qty    [id number]  GAIT TRAINING EA 15 MIN 5/9/2022 Kobe No, PT GP 1    [id number] HC PT EVAL LOW COMPLEXITY 4 5/9/2022 Kobe No, PT GP 1    [id number]  PT THER SUPP EA 15 MIN 5/9/2022 Kobe No, PT GP 3          PT G-Codes  Outcome Measure Options: AM-PAC 6 Clicks Basic Mobility (PT), PADD  AM-PAC 6 Clicks Score (PT): 20    Kobe No, PT  5/9/2022

## 2022-05-09 NOTE — OP NOTE
TOTAL KNEE ARTHROPLASTY  Progress Note    Ja Barros  5/9/2022    Pre-op Diagnosis:   Left knee osteoarthritis       Post-Op Diagnosis Codes:  Left knee osteoarthritis    Procedure/CPT® Codes:  52078      Procedure(s):  TOTAL KNEE ARTHROPLASTY- LEFT    Surgeon(s):  Cristian Jeffers MD    Assistant: Alysia Alves PA  was responsible for performing the following activities: Retraction, Suction, Irrigation, Suturing, Closing, Placing Dressing and Cement removal during component placement and their skilled assistance was necessary for the success of this case.    Anesthesia: Spinal    Staff:   Circulator: Rubi Nation RN  Scrub Person: Larry Griffiths  Vendor Representative: Arben Ralph  Nursing Assistant: Juancarlos Miller PCT  Assistant: Alysia Alves PA  Assistant: Alysia Alves PA      Estimated Blood Loss: minimal    Urine Voided: * No values recorded between 5/9/2022  7:42 AM and 5/9/2022  9:18 AM *    Specimens:                None          Drains: * No LDAs found *    Findings: High-grade changes as expected.  Mid flexion instability after correcting for varus and internal rotation; improved with deep dish insert.    Complications: None    Implants: [employer] system              Femoral size 7 (PCL sparing)              Patellar button size 35              Tibia size 6 with deep dish 13 mm poly spacer      Indications:  56-year-old man with endstage left knee osteoarthritic symptoms and corresponding x-rays showing complete loss medial joint space and longstanding varus alignment.. No sustained relief from reasonable non-operative management.  Risks benefits and indications and rationale for left total knee arthroplasty discussed at length with patient.  Patient is made aware of possible mechanical or infectious complications for TKA as well as possible perioperative medical complication. Patient signs his own consent after all questions are answered.      Procedure:  While in the OR  spinal anesthesia started with satisfactory level. Turned to the supine position and prepped and draped in standard fashion from mid-thigh to the ankle using the sliding leg conner. Tourniquet inflated at 300 mg Hg with total tourniquet time approximately 88 minutes. Standard anterior incision made medial to the patella. Medial parapatellar arthrotomy performed. Patella was everted after adequate anterolateral debridement.  High grade medial compartment changes noted as well as moderate patellofemoral and lesser lateral compartment changes.  Intramedullary guides used for femoral preparation to size 7. Standard cuts made almost orthogonal to [place]'s line. According to the guide the cuts were made in 6 degrees external rotation and even with that there was still minimal residual internal rotation relative to the epicondylar axis and [place] line.  Femoral trial showed full easy extension and stable deep flexion with good patellar tracking. Proximal tibia exposed with circumferential meniscectomy. ACL remnant was removed. PCL was preserved. Proximal tibia cut made nearly perpendicular to the mechanical axis using the intramedullary guide.  Cut was more extensive lateral than medial due to the varus alignment in the posterior medial wear.  Moderate medial joint line osteophytes removed. Tibia sized to #6 with 13 mm trial spacer showing adequate terminal extension after debriding posterior osteophytes.  Mid flexion instability was noted which improved remarkably using the 13 mm deep dish trials.  Patella cut with jig leaving 16 mm remnant. Patellar trial positioned and prepared accordingly. Trials at this point showed easy motion, patellar tracking and varus-valgus stability.  Position of the femur was marked and rotation of the tibia also marked and the final preparations made.  Motion was approximately 0/0/140 with optimal deep flexion stability with the deep dish 13 mm spacer. Trials removed and bone surfaces  thoroughly irrigated. Standard cement technique used for femoral and tibial  and patellar components with excess cement removed during the curing process. Final components assembled and reduced with stability and range of motion and patellar tracking similar to the trials. Wound was thoroughly irrigated and closed in layers without a drain. Standard Omar dressing applied. Final counts were correct. Patient stable to recovery having tolerated procedure well throughout.          Cristian Jeffers MD        Date: 5/9/2022  Time: 09:36 EDT

## 2022-05-09 NOTE — ANESTHESIA PREPROCEDURE EVALUATION
Anesthesia Evaluation     Patient summary reviewed and Nursing notes reviewed   no history of anesthetic complications:  NPO Solid Status: > 8 hours  NPO Liquid Status: > 2 hours           Airway   Mallampati: II  TM distance: >3 FB  Neck ROM: full  No difficulty expected  Dental - normal exam     Pulmonary - negative pulmonary ROS and normal exam    breath sounds clear to auscultation  (-) COPD, asthma, sleep apnea, not a smoker  Cardiovascular - negative cardio ROS and normal exam  Exercise tolerance: good (4-7 METS)    ECG reviewed    (-) dysrhythmias, angina, orthopnea, HUIZAR, murmur      Neuro/Psych- negative ROS  (-) seizures, CVA  GI/Hepatic/Renal/Endo    (+) obesity,     (-) GERD, liver disease, no renal disease, diabetes, no thyroid disorder    Musculoskeletal     Abdominal    Substance History      OB/GYN          Other   arthritis,                      Anesthesia Plan    ASA 2     spinal   (Post-op ACB with catheter)  intravenous induction     Anesthetic plan, all risks, benefits, and alternatives have been provided, discussed and informed consent has been obtained with: patient.    Plan discussed with CRNA.        CODE STATUS:

## 2022-05-09 NOTE — ANESTHESIA PROCEDURE NOTES
Adductor Canal catheter      Patient reassessed immediately prior to procedure    Patient location during procedure: post-op  Start time: 5/9/2022 9:53 AM  Reason for block: at surgeon's request and post-op pain management  Performed by  CRNA/CAA: Lorrie Baekr CRNA  Assisted by: Vale Ring RNSRNA: Lindsay Casas SRNA  Preanesthetic Checklist  Completed: patient identified, IV checked, site marked, risks and benefits discussed, surgical consent, monitors and equipment checked, pre-op evaluation and timeout performed  Prep:  Pt Position: supine  Sterile barriers:cap, gloves, mask and sterile barriers  Prep: ChloraPrep  Patient monitoring: blood pressure monitoring, continuous pulse oximetry and EKG  Procedure    Sedation: no  Performed under: spinal  Guidance:ultrasound guided  Images:still images obtained, printed/placed on chart    Laterality:left  Block Type:adductor canal block  Injection Technique:catheter  Needle Type:Tuohy and echogenic  Needle Gauge:18 G  Resistance on Injection: none  Catheter Size:20 G (20g)  Cath Depth at skin: 12 cm    Medications Used: bupivacaine PF (MARCAINE) 0.25 % injection, 30 mL  Med administered at 5/9/2022 9:54 AM      Medications  Preservative Free Saline:5ml    Post Assessment  Injection Assessment: negative aspiration for heme, incremental injection and no paresthesia on injection  Patient Tolerance:comfortable throughout block  Complications:no  Additional Notes  Procedure:             The pt was placed in the Supine position.  The Insertion site was  prepped and Draped in sterile fashion.  The pt was anesthetized with  IV Sedation( see meds).  Skin and cutaneous tissue was infiltrated and anesthetized with 1% Lidocaine 3 mls via a 25g needle.  A BBraun 4 inch 18g echogenic needle was then  inserted approximately midline, mid-thigh and advanced In-plane with Ultrasound guidance.  Normal Saline PSF was utilized for hydrodissection of tissue.  The Vastus medialis and  Sartorius muscle where visualized and the needle tip was placed in the adductor canal,  lateral to the femoral artery.  LA injection spread was visualized, injection was incremental 1-5ml, injection pressure was normal or little, no intraneural injection, no vascular injection.  LA dose was injected thru the needle(see dose above).  A BBraun 20g wire stylet catheter was placed via the needle with ultrasound visualization and confirmation with NS fluid bolus. The catheter insertion site was sealed with exofin tissue adhesive. The labeled catheter was then coiled and secured to skin with benzoin,  steristrips and CHG transparent dressing.  Appropriate labels were applied.  Thank you.

## 2022-05-09 NOTE — ANESTHESIA PROCEDURE NOTES
Spinal Block    Pre-sedation assessment completed: 5/9/2022 7:50 AM    Patient reassessed immediately prior to procedure    Patient location during procedure: OR  Start Time: 5/9/2022 7:46 AM  Stop Time: 5/9/2022 7:51 AM  Indication:at surgeon's request  Performed By  ANA LAURA/CAA: Nader Vines CRNASRNA: Lindsay Casas SRNA  Preanesthetic Checklist  Completed: patient identified, IV checked, site marked, risks and benefits discussed, surgical consent, monitors and equipment checked, pre-op evaluation and timeout performed  Spinal Block Prep:  Patient Position:sitting  Sterile Tech:cap, gloves, sterile barriers and mask  Prep:Chloraprep  Patient Monitoring:blood pressure monitoring, continuous pulse oximetry and EKG  Spinal Block Procedure  Approach:midline  Guidance:landmark technique and palpation technique  Location:L4-L5  Needle Type:Sprotte  Needle Gauge:25 G  Placement of Spinal needle event:cerebrospinal fluid aspirated  Paresthesia: no  Fluid Appearance:clear  Medications: bupivacaine (MARCAINE) 0.5 % injection, 2.2 mL  Med Administered at 5/9/2022 7:50 AM   Post Assessment  Patient Tolerance:patient tolerated the procedure well with no apparent complications  Complications no  Additional Notes  Procedure:  Pt assisted to sitting position, with legs in position of comfort over side of bed.  Pt. instructed in optimal spine presentation, the spine was prepped/ Draped and the skin at insertion site was anesthetized with 1% Lidocaine 2 ml.  The spinal needle was then advanced until CSF flow was obtained and LA was injected:

## 2022-05-09 NOTE — PLAN OF CARE
Problem: Adult Inpatient Plan of Care  Goal: Plan of Care Review  Flowsheets (Taken 5/9/2022 1305)  Progress: improving  Plan of Care Reviewed With: patient  Outcome Evaluation: PT eval complete. Pt ambulated 310 feet using RW, CGA, and one person to manage equipment. Pt ascended/descended 20 steps using bilateral HRs and CGA. Gait/stair training limited by fatigue. Bed mobility performed with supervision and STS with CGA. No knee buckling noted. Pt IND with SLR. L Knee AROM measured at 5-75 degrees. Reviewed HEP and knee precautions via handout. Educated on safe car transfers. PADD score = 10. ADLs assessed, pt does not require OT eval tonight. Functionally, pt safe to d/c home with assist today from a PT perspective. Recommend HHPT.   Goal Outcome Evaluation:  Plan of Care Reviewed With: patient        Progress: improving  Outcome Evaluation: PT eval complete. Pt ambulated 310 feet using RW, CGA, and one person to manage equipment. Pt ascended/descended 20 steps using bilateral HRs and CGA. Gait/stair training limited by fatigue. Bed mobility performed with supervision and STS with CGA. No knee buckling noted. Pt IND with SLR. L Knee AROM measured at 5-75 degrees. Reviewed HEP and knee precautions via handout. Educated on safe car transfers. PADD score = 10. ADLs assessed, pt does not require OT eval tonight. Functionally, pt safe to d/c home with assist today from a PT perspective. Recommend HHPT.

## 2022-05-09 NOTE — H&P
Patient Care Team:    Chief complaint  Left knee pain    Subjective:    Patient is a 56 y.o.male presents with a 25 year history of left knee pain.  He has had previous  Partial menisectomy, and micro fracture surgery.  Currently has constant pain which is worse with standing, walking and climbing stairs   PT, NSAIDs have been of limited benefit.  He presents today for left total knee replacement     Review of Systems:  General ROS: negative  Cardiovascular ROS: no chest pain or dyspnea on exertion  Respiratory ROS: no cough, shortness of breath, or wheezing      Allergies: No Known Allergies       Latex: neg  Contrast Dye neg    Home Meds    No medications prior to admission.     PMH:   Past Medical History:   Diagnosis Date    Arthritis     History of MRSA infection     [facility]    Wears eyeglasses      PSH:    Past Surgical History:   Procedure Laterality Date    COLONOSCOPY      EYE SURGERY Bilateral     RK 1991    HAND SURGERY Right     thumb reattachment age 10    KNEE ARTHROSCOPY Left     SKIN BIOPSY       Immunization History: pneumo up to date    Flu  2021  Tetanus  4 years ago  COVID x3  Social History:   Tobacco neg   Alcohol very rarely      Physical Exam  144/94   02sat 97%  T 97  HR 70    General Appearance:    Alert, cooperative, no distress, appears stated age   Head:    Normocephalic, without obvious abnormality, atraumatic   Lungs:     Clear to auscultation bilaterally, respirations unlabored    Heart: Regular rate and rhythm, S1 and S2 normal, no murmur, rub    or gallop    Abdomen:    Soft without tenderness   Breast Exam:    deferred   Genitalia:    deferred   Extremities:   Extremities normal, atraumatic, no cyanosis or edema   Skin:   Skin color, texture, turgor normal, no rashes or lesions   Neurologic:   Grossly intact     Results Review:   LABS:  Lab Results   Component Value Date    WBC 6.81 05/03/2022    HGB 14.2 05/03/2022    HCT 42.2 05/03/2022    MCV 88.3  05/03/2022     05/03/2022    NEUTROABS 3.58 05/03/2022    GLUCOSE 124 (H) 05/03/2022    BUN 13 05/03/2022    CREATININE 0.79 05/03/2022     05/03/2022    K 4.4 05/03/2022     05/03/2022    CO2 28.0 05/03/2022    CALCIUM 9.4 05/03/2022    ALBUMIN 4.60 05/03/2022    AST 25 05/03/2022    ALT 30 05/03/2022    BILITOT 0.6 05/03/2022       RADIOLOGY:  Imaging Results (Last 72 Hours)       ** No results found for the last 72 hours. **                 Cancer Patient: __ yes __no __unknown; If yes, clinical stage T:__ N:__M:__, stage group    Impression: Primary osteoarthritis left knee    Plan: Left total knee arthroplasty  Stefanie George PA-C 5/9/2022 06:56 EDT

## 2022-05-09 NOTE — H&P
Patient Name: Ja Barros  MRN: [id number]  : 1965  DOS: 2022    Attending: Cristian Jeffers MD    Primary Care Provider: Provider, No Known      Chief complaint: Left knee Pain.    Subjective   Patient is a pleasant 56 y.o. male presented for scheduled surgery by Dr. Jeffers.    Per his note (56-year-old man with endstage left knee osteoarthritic symptoms and corresponding x-rays showing complete loss medial joint space and longstanding varus alignment.. No sustained relief from reasonable non-operative management.  Risks benefits and indications and rationale for left total knee arthroplasty discussed at length with patient.  Patient is made aware of possible mechanical or infectious complications for TKA as well as possible perioperative medical complication. Patient signs his own consent after all questions are answered.)       Patient underwent left total knee arthroplasty under spinal anesthesia, tolerated surgery well, is admitted for the management.  Adductor canal nerve block catheter was placed by acute pain service.    I saw him in his room postoperatively, doing fairly well, no complaints of nausea, vomiting, or shortness of breath.    Spinal anesthesia effects are starting to subside, he has tolerated p.o. diet, he has voided.    Has no history of DVT or PE.    Allergies:  No Known Allergies    Meds:  No medications prior to admission.         History:   Past Medical History:   Diagnosis Date   • Arthritis    • History of MRSA infection     [facility]   • Wears eyeglasses      Past Surgical History:   Procedure Laterality Date   • COLONOSCOPY     • EYE SURGERY Bilateral     RK    • HAND SURGERY Right     thumb reattachment age 10   • KNEE ARTHROSCOPY Left    • SKIN BIOPSY       History reviewed. No pertinent family history.  Social History     Tobacco Use   • Smoking status: Never Smoker   • Smokeless tobacco: Former User     Quit date:    Vaping Use   •  "Vaping Use: Never used   Substance Use Topics   • Alcohol use: Not Currently   • Drug use: Never   Lives alone.  A daughter will be staying with him post discharge to help during the early home rehab.    Review of Systems  Pertinent items are noted in HPI, all other systems reviewed and negative    Vital Signs  /86 (BP Location: Left arm, Patient Position: Lying)   Pulse 78   Temp 97.5 °F (36.4 °C) (Oral)   Resp 17   Ht 177.8 cm (70\")   Wt 132 kg (291 lb)   SpO2 93%   BMI 41.75 kg/m²     Physical Exam:    General Appearance:    Alert, cooperative, in no acute distress   Head:    Normocephalic, without obvious abnormality, atraumatic   Eyes:            Lids and lashes normal, conjunctivae and sclerae normal, no   icterus, no pallor, corneas clear    Ears:    Ears appear intact with no abnormalities noted   Throat:   No oral lesions, no thrush, oral mucosa moist   Neck:   No adenopathy, supple, trachea midline, no thyromegaly         Lungs:     Clear to auscultation,respirations regular, even and                   unlabored    Heart:    Regular rhythm and normal rate, normal S1 and S2, no       murmur, no gallop   Abdomen:     Normal bowel sounds, no masses, no organomegaly, soft        non-tender, non-distended, no guarding, no rebound                 tenderness   Genitalia:    Deferred   Extremities:  Left LE, CDI bebe dressing on knee, PNB cath present.    Pulses:   Pulses palpable and equal bilaterally   Skin:   No bleeding, bruising or rash   Neurologic:   Cranial nerves 2 - 12 grossly intact, starting to have movement in his lower extremities including flexion dorsiflexion bilateral feet with subsiding effects of spinal anesthesia.      I reviewed the patient's new clinical results.       Results from last 7 days   Lab Units 05/03/22  1313   WBC 10*3/mm3 6.81   HEMOGLOBIN g/dL 14.2   HEMATOCRIT % 42.2   PLATELETS 10*3/mm3 262     Results from last 7 days   Lab Units 05/09/22  1202 05/03/22  1313 "   SODIUM mmol/L  --  142   POTASSIUM mmol/L 4.4 4.4   CHLORIDE mmol/L  --  106   CO2 mmol/L  --  28.0   BUN mg/dL  --  13   CREATININE mg/dL  --  0.79   CALCIUM mg/dL  --  9.4   BILIRUBIN mg/dL  --  0.6   ALK PHOS U/L  --  60   ALT (SGPT) U/L  --  30   AST (SGOT) U/L  --  25   GLUCOSE mg/dL  --  124*     Lab Results   Component Value Date    HGBA1C 5.80 (H) 05/03/2022       Assessment and Plan:       S/P total knee arthroplasty, left    Arthritis of left knee    Obesity    Elevated hgb A1C.    Plan  1. PT/OT,  Weight bearing as tolerated left LE  2. Pain control-prns, ACB cath with ropivacaine infusion.  3. IS-encourage  4. DVT proph- Mechanicals and aspirin  5. Bowel regimen  6. Resume home medications as appropriate  7. Monitor post-op labs  8. DC planning for home    Patient is very motivated to work with physical therapy and achieve  mobility and pain control among other goals for possible discharge home later in the day.    We reviewed these goals and discussed with patient tracking his progress for the next few hours and if all is achieved to receive next antibiotic prophylactic dose and be discharged home.     We discussed medications and precriptions at time of discharge including DVT prophylaxis, pain control, and bowel regimen.  All questions were answered .    Patient expressed understanding and agreement.      Dragon disclaimer:  Part of this encounter note is an electronic transcription/translation of spoken language to printed text. The electronic translation of spoken language may permit erroneous, or at times, nonsensical words or phrases to be inadvertently transcribed; Although I have reviewed the note for such errors, some may still exist.    Chidi Chavez MD  05/09/22  13:14 EDT

## 2022-05-09 NOTE — ANESTHESIA POSTPROCEDURE EVALUATION
Patient: Ja Barros    Procedure Summary     Date: 05/09/22 Room / Location:  [facility]  [facility]  [facility]    Anesthesia Start: 0742 Anesthesia Stop: 0953    Procedure: TOTAL KNEE ARTHROPLASTY- LEFT (Left Knee) Diagnosis:     Surgeons: Critsian Jeffers MD Provider: Lloyd Merchant Jr., MD    Anesthesia Type: spinal ASA Status: 2          Anesthesia Type: spinal    Vitals  No vitals data found for the desired time range.          Post Anesthesia Care and Evaluation    Patient location during evaluation: PACU  Patient participation: complete - patient participated  Level of consciousness: awake and alert  Pain management: adequate  Airway patency: patent  Anesthetic complications: No anesthetic complications  PONV Status: none  Cardiovascular status: hemodynamically stable and acceptable  Respiratory status: nonlabored ventilation, acceptable and nasal cannula  Hydration status: acceptable

## 2022-05-10 NOTE — PROGRESS NOTES
FRANCES Martin    Nerve Cath Post Op Call    Patient Name: Ja Barros  :  1965  MRN:  [id number]  Date of Discharge: 2022    Nerve Cath Post Op Call:    Catheter Plan:Patient called, No answer. Message left to call [facility] pain service for any questions or complaints  Patient/Family instructed to call ON CALL anesthesia provider for any questions or problems.  Patient Follow Up:

## 2022-05-11 NOTE — PROGRESS NOTES
FRANCES Martin    Nerve Cath Post Op Call    Patient Name: Ja Barros  :  1965  MRN:  [id number]  Date of Discharge: 2022    Nerve Cath Post Op Call:    Analgesia:Poor  Pain Score:6/10  Side Effects:None  Catheter Site:clean  Volume: 12cc/hr.  Catheter Plan:Will continue with plan at home without changes and The patient was instructed to call ON CALL Anesthesia provider for any questions or problems  Patient/Family instructed to call ON CALL anesthesia provider for any questions or problems.  Patient Follow Up:    Pt states he is in so much pain and has been taking his pain pills every 4 hrs. He states his pain pump has be running @ 14cc/hr since yesterday. Pt advised to dial it back down to 12 if he wants to keep it high (pt's weight is 132 kg).

## 2022-05-15 NOTE — PROGRESS NOTES
FRANCES Martin    Nerve Cath Post Op Call    Patient Name: Ja Barros  :  1965  MRN:  [id number]  Date of Discharge: 2022    Nerve Cath Post Op Call:    Analgesia:Good  Pain Score:3/10  Catheter Plan:Patient/Family member report nerve catheter previously discontinued, tip intact  Patient/Family instructed to call ON CALL anesthesia provider for any questions or problems.  Patient Follow Up:

## 2023-11-13 ENCOUNTER — OFFICE VISIT (OUTPATIENT)
Age: 58
End: 2023-11-13

## 2023-11-13 VITALS
WEIGHT: 294 LBS | OXYGEN SATURATION: 92 % | HEART RATE: 79 BPM | TEMPERATURE: 97.3 F | HEIGHT: 69 IN | SYSTOLIC BLOOD PRESSURE: 124 MMHG | DIASTOLIC BLOOD PRESSURE: 82 MMHG | BODY MASS INDEX: 43.55 KG/M2

## 2023-11-13 DIAGNOSIS — J98.6 DIAPHRAGMATIC PARALYSIS: Primary | ICD-10-CM

## 2023-11-13 RX ORDER — ALBUTEROL SULFATE 90 UG/1
2 AEROSOL, METERED RESPIRATORY (INHALATION) EVERY 4 HOURS PRN
COMMUNITY

## 2023-11-13 NOTE — PROGRESS NOTES
"Chief Complaint  Establish Care (Diaphram paralyzed Faustina xray )    Subjective        Ja Barros presents to [facility]  History of Present Illness  Pt is here today with shortness of breath. He reports that in June he had an episode of hypoxia. He was admitted to the ER for 4 days . While admitted, he was on oxygen. He reports he currently sleeps with Bi-Pap. He continues to experience shortness of breath. He was diagnosed with bilateral paralyzed diaphragm. He is currently undergoing neurological testing. He has also been referred to  for Neurology. He has had a CT of the Chest and reports he has undergone a series of tests. He does appear short of breath during visit. He is able to converse easily. O2 saturation of 92%; he states he uses Oxygen and Bi-Pap at home so that he can keep his O2 up. He has more difficulty breathing when lying flat. He feels confident in the care he has received, but would like a second opinion. We discussed possibly following up with [facility] for further evaluation.     Objective   Vital Signs:  /82   Pulse 79   Temp 97.3 °F (36.3 °C)   Ht 175.3 cm (69\")   Wt 133 kg (294 lb)   SpO2 92%   BMI 43.42 kg/m²   Estimated body mass index is 43.42 kg/m² as calculated from the following:    Height as of this encounter: 175.3 cm (69\").    Weight as of this encounter: 133 kg (294 lb).           Physical Exam   Result Review :                Assessment and Plan   Diagnoses and all orders for this visit:    1. Diaphragmatic paralysis (Primary)     - Continue with scheduled appointments with Neurology. Sign [facility] form for records.          Follow Up   No follow-ups on file.  Patient was given instructions and counseling regarding his condition or for health maintenance advice. Please see specific information pulled into the AVS if appropriate.         "

## 2023-11-17 ENCOUNTER — PATIENT ROUNDING (BHMG ONLY) (OUTPATIENT)
Age: 58
End: 2023-11-17
Payer: OTHER GOVERNMENT

## (undated) DEVICE — NEEDLE, QUINCKE 22GX3.5": Brand: MEDLINE INDUSTRIES, INC.

## (undated) DEVICE — TRAP FLD MINIVAC MEGADYNE 100ML

## (undated) DEVICE — STERILE PVP: Brand: MEDLINE INDUSTRIES, INC.

## (undated) DEVICE — 3 BONE CEMENT MIXER: Brand: MIXEVAC

## (undated) DEVICE — ELECTRD BLD EZ CLN STD 4IN

## (undated) DEVICE — PAD ARMBRD SURG CONVOL 7.5X20X2IN

## (undated) DEVICE — GLV SURG PREMIERPRO MIC LTX PF SZ8.5 BRN

## (undated) DEVICE — BLANKT WARM UPPR/BDY ARM/OUT 57X196CM

## (undated) DEVICE — PUMP PAIN AUTOFUSER AUTO SELCT NOBOLUS 1TO14ML/HR 550ML DISP

## (undated) DEVICE — PATIENT RETURN ELECTRODE, SINGLE-USE, CONTACT QUALITY MONITORING, ADULT, WITH 9FT CORD, FOR PATIENTS WEIGING OVER 33LBS. (15KG): Brand: MEGADYNE

## (undated) DEVICE — STRYKER PERFORMANCE SERIES SAGITTAL BLADE: Brand: STRYKER PERFORMANCE SERIES

## (undated) DEVICE — TB SXN FRAZIER 12F STRL

## (undated) DEVICE — PULLOVER TOGA, 2X LARGE: Brand: FLYTE, SURGICOOL

## (undated) DEVICE — DISPOSABLE TOURNIQUET CUFF SINGLE BLADDER, DUAL PORT AND QUICK CONNECT CONNECTOR: Brand: COLOR CUFF

## (undated) DEVICE — ANTIBACTERIAL UNDYED BRAIDED (POLYGLACTIN 910), SYNTHETIC ABSORBABLE SUTURE: Brand: COATED VICRYL

## (undated) DEVICE — SYR LUERLOK 50ML

## (undated) DEVICE — TBG PENCL TELESCP MEGADYNE SMOKE EVAC 10FT

## (undated) DEVICE — TRY EPID SFTY 18G 3.5IN 1T7680

## (undated) DEVICE — GLV SURG SIGNATURE TOUCH PF LTX 8 STRL BX/50

## (undated) DEVICE — SPK TRANSFR TRANSOFIX DBL STRL

## (undated) DEVICE — PK KN TOTL 10